# Patient Record
Sex: FEMALE | Race: WHITE | NOT HISPANIC OR LATINO | Employment: FULL TIME | URBAN - NONMETROPOLITAN AREA
[De-identification: names, ages, dates, MRNs, and addresses within clinical notes are randomized per-mention and may not be internally consistent; named-entity substitution may affect disease eponyms.]

---

## 2022-11-21 ENCOUNTER — OFFICE VISIT (OUTPATIENT)
Dept: URGENT CARE | Facility: PHYSICIAN GROUP | Age: 37
End: 2022-11-21

## 2022-11-21 ENCOUNTER — HOSPITAL ENCOUNTER (OUTPATIENT)
Facility: MEDICAL CENTER | Age: 37
End: 2022-11-21
Attending: FAMILY MEDICINE

## 2022-11-21 VITALS
RESPIRATION RATE: 16 BRPM | SYSTOLIC BLOOD PRESSURE: 139 MMHG | WEIGHT: 138.6 LBS | HEIGHT: 62 IN | HEART RATE: 102 BPM | OXYGEN SATURATION: 97 % | DIASTOLIC BLOOD PRESSURE: 88 MMHG | BODY MASS INDEX: 25.51 KG/M2 | TEMPERATURE: 98.8 F

## 2022-11-21 DIAGNOSIS — N30.00 ACUTE CYSTITIS WITHOUT HEMATURIA: ICD-10-CM

## 2022-11-21 PROBLEM — H55.00 NYSTAGMUS: Status: ACTIVE | Noted: 2021-05-03

## 2022-11-21 PROBLEM — L98.9 SKIN LESION: Status: ACTIVE | Noted: 2018-01-19

## 2022-11-21 PROBLEM — G89.29 CHRONIC BACK PAIN GREATER THAN 3 MONTHS DURATION: Status: ACTIVE | Noted: 2022-11-21

## 2022-11-21 PROBLEM — F29 PSYCHOSIS (HCC): Status: ACTIVE | Noted: 2021-07-08

## 2022-11-21 PROBLEM — N92.6 IRREGULAR MENSTRUAL CYCLE: Status: ACTIVE | Noted: 2018-01-19

## 2022-11-21 PROBLEM — M54.9 CHRONIC BACK PAIN GREATER THAN 3 MONTHS DURATION: Status: ACTIVE | Noted: 2022-11-21

## 2022-11-21 PROBLEM — F41.9 ANXIETY: Status: ACTIVE | Noted: 2022-11-21

## 2022-11-21 LAB
APPEARANCE UR: NORMAL
BILIRUB UR STRIP-MCNC: NORMAL MG/DL
COLOR UR AUTO: YELLOW
GLUCOSE UR STRIP.AUTO-MCNC: NORMAL MG/DL
INT CON NEG: NORMAL
INT CON POS: NORMAL
KETONES UR STRIP.AUTO-MCNC: NORMAL MG/DL
LEUKOCYTE ESTERASE UR QL STRIP.AUTO: NORMAL
NITRITE UR QL STRIP.AUTO: NORMAL
PH UR STRIP.AUTO: 5.5 [PH] (ref 5–8)
POC URINE PREGNANCY TEST: NORMAL
PROT UR QL STRIP: NORMAL MG/DL
RBC UR QL AUTO: NORMAL
SP GR UR STRIP.AUTO: 1.01
UROBILINOGEN UR STRIP-MCNC: 0.2 MG/DL

## 2022-11-21 PROCEDURE — 81002 URINALYSIS NONAUTO W/O SCOPE: CPT | Performed by: FAMILY MEDICINE

## 2022-11-21 PROCEDURE — 87086 URINE CULTURE/COLONY COUNT: CPT

## 2022-11-21 PROCEDURE — 87077 CULTURE AEROBIC IDENTIFY: CPT

## 2022-11-21 PROCEDURE — 87186 SC STD MICRODIL/AGAR DIL: CPT

## 2022-11-21 PROCEDURE — 81025 URINE PREGNANCY TEST: CPT | Performed by: FAMILY MEDICINE

## 2022-11-21 PROCEDURE — 99213 OFFICE O/P EST LOW 20 MIN: CPT | Performed by: FAMILY MEDICINE

## 2022-11-21 RX ORDER — NITROFURANTOIN 25; 75 MG/1; MG/1
100 CAPSULE ORAL 2 TIMES DAILY
Qty: 10 CAPSULE | Refills: 0 | Status: SHIPPED | OUTPATIENT
Start: 2022-11-21 | End: 2022-11-26

## 2022-11-21 RX ORDER — CHOLECALCIFEROL (VITAMIN D3) 125 MCG
5 CAPSULE ORAL
COMMUNITY

## 2022-11-22 DIAGNOSIS — N30.00 ACUTE CYSTITIS WITHOUT HEMATURIA: ICD-10-CM

## 2022-11-22 NOTE — PROGRESS NOTES
--Subjective:      Chief Complaint   Patient presents with    UTI     Abdominal pain, frequency, pain with urination, x3 days                  Dysuria   This is a new problem. The current episode started in the past 3 days. The problem occurs every urination. The problem has been unchanged. The quality of the pain is described as burning. There has been no fever.  There is no history of pyelonephritis. Associated symptoms include frequency and urgency. Pertinent negatives include no chills, discharge, flank pain, nausea or vomiting. Pt has tried nothing for the symptoms. There is no history of recurrent UTIs.       LMP - 11/5        Social History     Socioeconomic History    Marital status:      Spouse name: Not on file    Number of children: Not on file    Years of education: Not on file    Highest education level: Not on file   Occupational History    Not on file   Tobacco Use    Smoking status: Not on file    Smokeless tobacco: Not on file   Substance and Sexual Activity    Alcohol use: Not on file    Drug use: Not on file    Sexual activity: Not on file   Other Topics Concern    Not on file   Social History Narrative    Not on file     Social Determinants of Health     Financial Resource Strain: Not on file   Food Insecurity: Not on file   Transportation Needs: Not on file   Physical Activity: Not on file   Stress: Not on file   Social Connections: Not on file   Intimate Partner Violence: Not on file   Housing Stability: Not on file         No family history on file.      No Known Allergies        Current Outpatient Medications on File Prior to Visit   Medication Sig Dispense Refill    ascorbic acid (VITAMIN C) 1000 MG tablet Take 1,000 mg by mouth every day.      melatonin 5 mg Tab Take 5 mg by mouth at bedtime.       No current facility-administered medications on file prior to visit.       Review of Systems   Constitutional: Negative for chills.   Respiratory: Negative for shortness of breath.   "  Cardiovascular: Negative for chest pain.   Gastrointestinal: Negative for nausea, vomiting and abdominal pain.   Genitourinary: Positive for dysuria, urgency and frequency. Negative for flank pain.   Skin: Negative for rash.   Neurological: Negative for dizziness and headaches.   All other systems reviewed and are negative.         Objective:      /88   Pulse (!) 102   Temp 37.1 °C (98.8 °F) (Temporal)   Resp 16   Ht 1.575 m (5' 2\")   Wt 62.9 kg (138 lb 9.6 oz)   SpO2 97%       Physical Exam   Constitutional: pt is oriented to person, place, and time. Pt appears well-developed and well-nourished. No distress.   HENT:   Head: Normocephalic and atraumatic.   Mouth/Throat: Mucous membranes are normal.   Eyes: Conjunctivae and EOM are normal. Pupils are equal, round, and reactive to light. Right eye exhibits no discharge. Left eye exhibits no discharge. No scleral icterus.   Neck: Normal range of motion. Neck supple.   Cardiovascular: Normal rate, regular rhythm, normal heart sounds and intact distal pulses.    No murmur heard.  Pulmonary/Chest: Effort normal and breath sounds normal. No respiratory distress. Pt has no wheezes,  rales.   Abdominal: Bowel sounds are normal. Pt exhibits no distension and no mass. There is + suprapubic  tenderness. There is no rebound, no guarding and no CVA tenderness.   Neurological: pt is alert and oriented to person, place, and time.   Skin: Skin is warm and dry.   Psychiatric: behavior is normal.   Nursing note and vitals reviewed.           No results found for: POCCOLOR, POCAPPEAR, POCLEUKEST, POCNITRITE, POCUROBILIGE, POCPROTEIN, POCURPH, POCBLOOD, POCSPGRV, POCKETONES, POCBILIRUBIN, POCGLUCUA         Assessment/Plan:     1. Acute cystitis without hematuria   UA findings c/w cystitis       - URINE CULTURE(NEW); Future  - nitrofurantoin (MACROBID) 100 MG Cap; Take 1 Capsule by mouth 2 times a day for 5 days.  Dispense: 10 Capsule; Refill: 0  - POCT Pregnancy  - URINE " CULTURE(NEW); Future      Differential diagnosis, natural history, supportive care, and indications for immediate follow-up discussed. All questions answered. Patient agrees with the plan of care.     Follow-up as needed if symptoms worsen or fail to improve to PCP, Urgent care or Emergency Room.     I have personally reviewed prior external notes and test results pertinent to today's visit.  I have independently reviewed and interpreted all diagnostics ordered during this urgent care acute visit.

## 2022-11-25 LAB
BACTERIA UR CULT: ABNORMAL
BACTERIA UR CULT: ABNORMAL
SIGNIFICANT IND 70042: ABNORMAL
SITE SITE: ABNORMAL
SOURCE SOURCE: ABNORMAL